# Patient Record
(demographics unavailable — no encounter records)

---

## 2025-07-07 NOTE — DISCUSSION/SUMMARY
[Medication Risks Reviewed] : Medication risks reviewed [Surgical risks reviewed] : Surgical risks reviewed [de-identified] : Patient is a 64 year old M here today for follow-up of severe right knee osteoarthritis. The patient has tried extensive conservative treatments in the past including directed physical therapy, activity modification, NSAID use, injections (last injection 4 years), and has failed to have relief of the pain in her knee. Due to the fact that they have and tried exhausted conservative treatment, the patient wishes to proceed with a total knee arthroplasty with boris, and I do think that is indicated. We discussed the risk and benefit alternatives including but not limited to blood loss, infection, damage to neurovascular structures risk, need for revision surgery risk of periprosthetic fracture. Patient is in agreement and wishes to proceed.  We will obtain a CT scan for Boris planning. We will obtain medical clearance. The patient should follow up post operatively for repeat evaluation. All questions were addressed. The patient verbalized understanding and is in agreement with the plan. The patient is a 64 year old male who has severe osteoarthritis of the right knee. Based upon the patient's continued symptoms and failure to respond to conservative treatment I have recommended a right total knee replacement for the patient. A discussion was had with the patient regarding a right total knee replacement. A long discussion was had with the patient as what the total joint replacement would entail. A model was used to demonstrate the operation and to discuss bearing surfaces of the implants. The hospitalization and rehabilitation were discussed. The use of perioperative antibiotics and DVT prophylaxis were discussed. The risks, benefits and alternatives to surgical intervention were discussed at length with the patient. Specific risks discussed included: infection, wound breakdown, numbness and damage to nerves, tendon, muscle, arteries or other blood vessels. The possibility of recurrent pain, no improvement in pain and actual worsening of the pain were also mentioned in conversation with the patient. Medical complications related to the patient's general medical health including deep vein thrombosis, pulmonary embolus, heart attack, stroke, death and other complications from anesthesia were discussed as well. The patient was told that we will take steps to minimize these risks by using sterile technique, antibiotics and DVT prophylaxis when appropriate and following the patient postoperatively in the clinic setting to monitor progress. The benefits of surgery were discussed with the patient including the potential to improve the current clinical condition through operative intervention. Alternatives to surgical intervention include continued conservative management which may yield less than optimal results in this particular patient. All questions were answered to the satisfaction of the patient. We did discuss implant choices and fixation, with shared decision making with the patient.   We discussed that the knee replacement will be done with robotic assistance to enhance accuracy and dynamic joint balancing.

## 2025-07-07 NOTE — ADDENDUM
[FreeTextEntry1] : This note was written by Nena Valente, acting as the  for Dr. Bajwa on 07/07/2025. This note accurately reflects the work and decisions made by Dr. Bajwa.

## 2025-07-07 NOTE — PHYSICAL EXAM
[de-identified] : GENERAL APPEARANCE: Well nourished and hydrated, pleasant, alert, and oriented x 3. Appears their stated age. HEENT: Normocephalic, extraocular eye motion intact. Nasal septum midline. Oral cavity clear. External auditory canal clear. RESPIRATORY: Breath sounds clear and audible in all lobes. No wheezing, No accessory muscle use. CARDIOVASCULAR: No apparent abnormalities. No lower leg edema. No varicosities. Pedal pulses are palpable. NEUROLOGIC: Sensation is normal, no muscle weakness in the upper or lower extremities. DERMATOLOGIC: No apparent skin lesions, moist, warm, no rash. SPINE: Cervical spine appears normal and moves freely; thoracic spine appears normal and moves freely; lumbosacral spine appears normal and moves freely, normal, nontender. MUSCULOSKELETAL: Hands, wrists, and elbows are normal and move freely, shoulders are normal and move freely. PSYCHIATRIC: Oriented to person, place, and time, insight and judgement were intact and the affect was normal. DTRs: Biceps, brachioradialis, triceps, patellar, ankle and plantar 2+ and symmetric bilaterally. Pulses: dorsalis pedis, posterior tibial, femoral, popliteal, and radial 2+ and symmetric bilaterally. Constitutional: Alert and in no acute distress, but well-appearing  [de-identified] : right knee exam shows no effusion, ROM is  degrees, no instability, no pain with Anrdez, medial and lateral joint line tenderness.5/5 motor strength in bilateral lower extremities. Sensory: Intact in bilateral lower extremities.  [de-identified] : 4 views of the right knee obtained the office today show no acute fracture or dislocation.  There is severe medial joint space narrowing bone osteoarthritis tricompartmental degenerative changes consistent Kellgren-Jose grade 4 changes

## 2025-07-07 NOTE — HISTORY OF PRESENT ILLNESS
[de-identified] : This is a 64 year old M pt presents today for an initial evaluation with right knee pain. The pain has been there for 2 months without injury. Has seen a provider in the past for symptoms. Pt is ambulating without use of any assistance device. He presents today with chief complaint of intermittent, moderate dull aching pain over the medial, lateral, and posterior aspect of the knee. Pain is worse during the morning time and located on the posterior muscle. Pt denies catching, locking or buckling. Reports constant pain that is exacerbated by walking, rising from a seated position, and standing for long periods of time. No radicular pain or paresthesia. No prior history of physical therapy noted. Prior history of gel and cortisone injections about 4 years ago, noted which minimally aided in symptoms. No constitutional symptoms noted. Is taking Ibuprofen for pain relief. Is considering surgical intervention.